# Patient Record
Sex: MALE | Race: BLACK OR AFRICAN AMERICAN | NOT HISPANIC OR LATINO | Employment: OTHER | URBAN - METROPOLITAN AREA
[De-identification: names, ages, dates, MRNs, and addresses within clinical notes are randomized per-mention and may not be internally consistent; named-entity substitution may affect disease eponyms.]

---

## 2023-12-05 ENCOUNTER — TELEPHONE (OUTPATIENT)
Dept: TRANSPLANT | Facility: CLINIC | Age: 73
End: 2023-12-05

## 2023-12-13 ENCOUNTER — TELEPHONE (OUTPATIENT)
Dept: TRANSPLANT | Facility: CLINIC | Age: 73
End: 2023-12-13

## 2023-12-13 NOTE — TELEPHONE ENCOUNTER
----- Message from Lesly Chan DO sent at 12/12/2023 12:07 PM CST -----  Regarding: RE: High Risk Referral  Not eligible for RR    Lesly  ----- Message -----  From: Koby Forde RN  Sent: 12/12/2023  11:32 AM CST  To: Fazal Short MD; #  Subject: High Risk Referral                               Good morning,    I received a referral on Mr. Oliveira, a 73 year old AA male whose history includes: Dialysis (6/29/2023), DM II, Hypertension, Mitral Valve Replacement (2/22/2022), A-Fib, long-term anticoagulant use (Coumadin), Hyperlipidemia, TIA ( 7/6/2023), CVA (9 /19/2019), HIV, former smoker (quit 4/14/2023), BPH with Prostatectomy, Syphilis (treated with PCN x 3 weekly doses; non reactive RR on 9/2022).    Based on this information is Mr. Oliveira a candidate for RR?    Thanks  Paola

## 2023-12-14 ENCOUNTER — DOCUMENTATION ONLY (OUTPATIENT)
Dept: TRANSPLANT | Facility: CLINIC | Age: 73
End: 2023-12-14

## 2025-06-14 ENCOUNTER — LAB REQUISITION (OUTPATIENT)
Dept: LAB | Facility: HOSPITAL | Age: 75
End: 2025-06-14

## 2025-06-14 LAB — HGB BLD-MCNC: 8 GM/DL (ref 14–18)

## 2025-06-14 PROCEDURE — 85018 HEMOGLOBIN: CPT | Performed by: INTERNAL MEDICINE
